# Patient Record
Sex: FEMALE | Race: WHITE | NOT HISPANIC OR LATINO
[De-identification: names, ages, dates, MRNs, and addresses within clinical notes are randomized per-mention and may not be internally consistent; named-entity substitution may affect disease eponyms.]

---

## 2024-03-01 ENCOUNTER — NON-APPOINTMENT (OUTPATIENT)
Age: 49
End: 2024-03-01

## 2024-03-07 ENCOUNTER — APPOINTMENT (OUTPATIENT)
Dept: PLASTIC SURGERY | Facility: CLINIC | Age: 49
End: 2024-03-07
Payer: COMMERCIAL

## 2024-03-07 VITALS
HEIGHT: 63 IN | OXYGEN SATURATION: 98 % | SYSTOLIC BLOOD PRESSURE: 143 MMHG | HEART RATE: 79 BPM | DIASTOLIC BLOOD PRESSURE: 84 MMHG | BODY MASS INDEX: 23.04 KG/M2 | WEIGHT: 130 LBS

## 2024-03-07 PROBLEM — Z00.00 ENCOUNTER FOR PREVENTIVE HEALTH EXAMINATION: Status: ACTIVE | Noted: 2024-03-07

## 2024-03-07 PROCEDURE — 99204 OFFICE O/P NEW MOD 45 MIN: CPT

## 2024-03-08 NOTE — REVIEW OF SYSTEMS
[Enlargement] : breast enlargement [Negative] : Heme/Lymph [FreeTextEntry4] : breast asymmetry after lumpectomy

## 2024-03-08 NOTE — ASSESSMENT
[FreeTextEntry1] : MICHELINE is prepared for capsule repairs and new implants  will use similar size with hpx gel MICHELINE understands that her reconstruction is acceptable and that the risks are larger than the potential benefits especially considering the history of radiation to the l side and grade 1 capsule.  The risks, benefits, alternatives, limitations and the permanent scars were outlined with the patient. Discussion of risks included but not limited to bleeding, infection, delayed healing, and anesthetic risk. All of MICHELINE 's questions and concerns were addressed and answered completely  The instructions were reviewed in detail with MICHELINE. MICHELINE desires to proceed and change both implants   she will still have a asymmetry of the nipple heights unless skin is removed l supra areolar area

## 2024-03-08 NOTE — PHYSICAL EXAM
[NI] : Normal [de-identified] : sn n 22 cm r 21 cm l nl sens no mass bilat imf scars and implants  r double bubble and l horizontal lumpectomy scar  r is smaller by 25 cc and  nipple heights asymmetric  capsules are gr 1

## 2024-03-08 NOTE — HISTORY OF PRESENT ILLNESS
[FreeTextEntry1] : MICHELINE is 50 y/o wf s/p l lumpectomy and radiation and chemotherapy  in 2006 for l breast cancer MICHELINE had implants placed by [facility] in 2020 for lumpectomy defect present  she has her implant card and there is a difference of 35 cc between r 175 and l 210 cc inamed implants  r side is smaller by significant amount and has double bubble deformity with arms elevated .  l side is larger and has mild flattening inferiorly with arms elevated  MICHELINE has mild asymmetry of nipple height with l side higher The risks, benefits, alternatives, limitations and the permanent scars were outlined with the patient. Discussion of risks included but not limited to bleeding, infection, delayed healing, and anesthetic risk. All of MICHELINE 's questions and concerns were addressed and answered completely  MICHELINE has grade 1  capsules bilaterally

## 2024-04-16 ENCOUNTER — APPOINTMENT (OUTPATIENT)
Dept: PLASTIC SURGERY | Facility: CLINIC | Age: 49
End: 2024-04-16
Payer: COMMERCIAL

## 2024-04-16 VITALS — OXYGEN SATURATION: 97 % | HEART RATE: 80 BPM | DIASTOLIC BLOOD PRESSURE: 76 MMHG | SYSTOLIC BLOOD PRESSURE: 124 MMHG

## 2024-04-16 DIAGNOSIS — Z90.12 ACQUIRED ABSENCE OF LEFT BREAST AND NIPPLE: ICD-10-CM

## 2024-04-16 DIAGNOSIS — Z98.82 BREAST IMPLANT STATUS: ICD-10-CM

## 2024-04-16 PROCEDURE — 99213 OFFICE O/P EST LOW 20 MIN: CPT

## 2024-04-17 PROBLEM — Z98.82 BREAST IMPLANT STATUS: Status: ACTIVE | Noted: 2024-03-08

## 2024-04-17 PROBLEM — Z90.12 HISTORY OF PARTIAL MASTECTOMY OF LEFT BREAST: Status: ACTIVE | Noted: 2024-03-08

## 2024-04-17 NOTE — ASSESSMENT
[FreeTextEntry1] : MICHELINE is prepared for exchange to same size hpx gel implants via imf scars The risks, benefits, alternatives, limitations and the permanent scars were outlined with the patient. Discussion of risks included but not limited to bleeding, infection, delayed healing, and anesthetic risk. All of MICHELINE 's questions and concerns were addressed and answered completely  The instructions were reviewed in detail with MICHELINE.

## 2024-04-17 NOTE — PHYSICAL EXAM
[NI] : Normal [de-identified] : sn n 22 cm r 21 cm l nl sens no mass bilat imf scars and implants  r double bubble and l horizontal lumpectomy scar  r is smaller by 25 cc and  nipple heights asymmetric  capsules are gr 1

## 2024-04-17 NOTE — HISTORY OF PRESENT ILLNESS
[FreeTextEntry1] : MICHELINE is prepared for surgery in may for removal of implants bilateral and replacement with hpx gel implants same size  MICHELINE is s/p partial mastectomy and rt to the l breast and has scar above the areola l side   there are rt tattoo marks and pt understands radiation may cause contracture which is not present at this time. MICHELINE desires same size implants as the l one is currently larger and the breast is larger as a result

## 2024-07-16 ENCOUNTER — APPOINTMENT (OUTPATIENT)
Dept: PLASTIC SURGERY | Facility: CLINIC | Age: 49
End: 2024-07-16
Payer: COMMERCIAL

## 2024-07-16 VITALS — DIASTOLIC BLOOD PRESSURE: 80 MMHG | SYSTOLIC BLOOD PRESSURE: 134 MMHG | OXYGEN SATURATION: 98 % | HEART RATE: 76 BPM

## 2024-07-16 DIAGNOSIS — Z90.12 ACQUIRED ABSENCE OF LEFT BREAST AND NIPPLE: ICD-10-CM

## 2024-07-16 DIAGNOSIS — Z98.82 BREAST IMPLANT STATUS: ICD-10-CM

## 2024-07-16 PROCEDURE — 99214 OFFICE O/P EST MOD 30 MIN: CPT

## 2024-08-05 ENCOUNTER — APPOINTMENT (OUTPATIENT)
Dept: PLASTIC SURGERY | Facility: HOSPITAL | Age: 49
End: 2024-08-05

## 2025-02-15 ENCOUNTER — NON-APPOINTMENT (OUTPATIENT)
Age: 50
End: 2025-02-15